# Patient Record
Sex: FEMALE | Race: WHITE | NOT HISPANIC OR LATINO | Employment: UNEMPLOYED | URBAN - METROPOLITAN AREA
[De-identification: names, ages, dates, MRNs, and addresses within clinical notes are randomized per-mention and may not be internally consistent; named-entity substitution may affect disease eponyms.]

---

## 2020-08-12 ENCOUNTER — HISTORICAL (OUTPATIENT)
Dept: INFUSION THERAPY | Facility: HOSPITAL | Age: 75
End: 2020-08-12

## 2020-11-12 ENCOUNTER — HISTORICAL (OUTPATIENT)
Dept: INFUSION THERAPY | Facility: HOSPITAL | Age: 75
End: 2020-11-12

## 2021-02-04 ENCOUNTER — HISTORICAL (OUTPATIENT)
Dept: INFUSION THERAPY | Facility: HOSPITAL | Age: 76
End: 2021-02-04

## 2021-04-29 ENCOUNTER — HISTORICAL (OUTPATIENT)
Dept: INFUSION THERAPY | Facility: HOSPITAL | Age: 76
End: 2021-04-29

## 2021-07-22 ENCOUNTER — HISTORICAL (OUTPATIENT)
Dept: INFUSION THERAPY | Facility: HOSPITAL | Age: 76
End: 2021-07-22

## 2022-04-29 DIAGNOSIS — C85.89 MARGINAL ZONE LYMPHOMA OF EXTRANODAL AND SOLID ORGAN SITES: Primary | ICD-10-CM

## 2022-04-29 DIAGNOSIS — D61.818 PANCYTOPENIA: ICD-10-CM

## 2022-06-08 RX ORDER — ENTECAVIR 1 MG/1
1 TABLET, FILM COATED ORAL DAILY
Qty: 30 TABLET | Refills: 2 | Status: CANCELLED | OUTPATIENT
Start: 2022-06-08

## 2022-06-08 RX ORDER — ENTECAVIR 1 MG/1
1 TABLET, FILM COATED ORAL DAILY
COMMUNITY
Start: 2021-11-03 | End: 2022-06-09 | Stop reason: SDUPTHER

## 2022-06-09 DIAGNOSIS — C85.89 MARGINAL ZONE LYMPHOMA OF EXTRANODAL AND SOLID ORGAN SITES: Primary | ICD-10-CM

## 2022-06-09 RX ORDER — ENTECAVIR 1 MG/1
1 TABLET, FILM COATED ORAL DAILY
Qty: 30 TABLET | Refills: 3 | Status: SHIPPED | OUTPATIENT
Start: 2022-06-09 | End: 2022-07-28 | Stop reason: SDUPTHER

## 2022-07-27 DIAGNOSIS — C85.89 MARGINAL ZONE LYMPHOMA OF EXTRANODAL AND SOLID ORGAN SITES: Primary | ICD-10-CM

## 2022-07-28 ENCOUNTER — OFFICE VISIT (OUTPATIENT)
Dept: HEMATOLOGY/ONCOLOGY | Facility: CLINIC | Age: 77
End: 2022-07-28
Payer: COMMERCIAL

## 2022-07-28 VITALS
HEART RATE: 80 BPM | SYSTOLIC BLOOD PRESSURE: 102 MMHG | DIASTOLIC BLOOD PRESSURE: 64 MMHG | RESPIRATION RATE: 16 BRPM | OXYGEN SATURATION: 94 % | BODY MASS INDEX: 17.51 KG/M2 | TEMPERATURE: 98 F | HEIGHT: 66 IN | WEIGHT: 108.94 LBS

## 2022-07-28 DIAGNOSIS — C85.89 MARGINAL ZONE LYMPHOMA OF EXTRANODAL AND SOLID ORGAN SITES: Primary | ICD-10-CM

## 2022-07-28 PROCEDURE — 99214 PR OFFICE/OUTPT VISIT, EST, LEVL IV, 30-39 MIN: ICD-10-PCS | Mod: S$GLB,,, | Performed by: INTERNAL MEDICINE

## 2022-07-28 PROCEDURE — 99999 PR PBB SHADOW E&M-EST. PATIENT-LVL IV: ICD-10-PCS | Mod: PBBFAC,,, | Performed by: INTERNAL MEDICINE

## 2022-07-28 PROCEDURE — 99214 OFFICE O/P EST MOD 30 MIN: CPT | Mod: S$GLB,,, | Performed by: INTERNAL MEDICINE

## 2022-07-28 PROCEDURE — 99999 PR PBB SHADOW E&M-EST. PATIENT-LVL IV: CPT | Mod: PBBFAC,,, | Performed by: INTERNAL MEDICINE

## 2022-07-28 RX ORDER — SACUBITRIL AND VALSARTAN 49; 51 MG/1; MG/1
1 TABLET, FILM COATED ORAL 2 TIMES DAILY
COMMUNITY
Start: 2022-07-21

## 2022-07-28 RX ORDER — METOPROLOL SUCCINATE 25 MG/1
1 TABLET, EXTENDED RELEASE ORAL DAILY
COMMUNITY
Start: 2022-07-21

## 2022-07-28 RX ORDER — ENTECAVIR 1 MG/1
1 TABLET, FILM COATED ORAL DAILY
Qty: 30 TABLET | Refills: 3 | Status: SHIPPED | OUTPATIENT
Start: 2022-07-28 | End: 2023-02-01 | Stop reason: SDUPTHER
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
## 2023-02-01 DIAGNOSIS — C85.89 MARGINAL ZONE LYMPHOMA OF EXTRANODAL AND SOLID ORGAN SITES: ICD-10-CM

## 2023-02-01 RX ORDER — ENTECAVIR 1 MG/1
1 TABLET, FILM COATED ORAL DAILY
Qty: 30 TABLET | Refills: 3 | Status: SHIPPED | OUTPATIENT
Start: 2023-02-01

## 2023-05-26 ENCOUNTER — OFFICE VISIT (OUTPATIENT)
Dept: HEMATOLOGY/ONCOLOGY | Facility: CLINIC | Age: 78
End: 2023-05-26
Payer: COMMERCIAL

## 2023-05-26 VITALS
HEART RATE: 78 BPM | RESPIRATION RATE: 20 BRPM | HEIGHT: 66 IN | OXYGEN SATURATION: 95 % | DIASTOLIC BLOOD PRESSURE: 63 MMHG | BODY MASS INDEX: 17.78 KG/M2 | SYSTOLIC BLOOD PRESSURE: 99 MMHG | WEIGHT: 110.63 LBS | TEMPERATURE: 98 F

## 2023-05-26 DIAGNOSIS — C85.89 MARGINAL ZONE LYMPHOMA OF EXTRANODAL AND SOLID ORGAN SITES: Primary | ICD-10-CM

## 2023-05-26 PROCEDURE — 99214 PR OFFICE/OUTPT VISIT, EST, LEVL IV, 30-39 MIN: ICD-10-PCS | Mod: S$GLB,,, | Performed by: INTERNAL MEDICINE

## 2023-05-26 PROCEDURE — 99214 OFFICE O/P EST MOD 30 MIN: CPT | Mod: S$GLB,,, | Performed by: INTERNAL MEDICINE

## 2023-05-26 PROCEDURE — 99999 PR PBB SHADOW E&M-EST. PATIENT-LVL IV: ICD-10-PCS | Mod: PBBFAC,,, | Performed by: INTERNAL MEDICINE

## 2023-05-26 PROCEDURE — 99999 PR PBB SHADOW E&M-EST. PATIENT-LVL IV: CPT | Mod: PBBFAC,,, | Performed by: INTERNAL MEDICINE
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
## 2023-11-16 ENCOUNTER — OFFICE VISIT (OUTPATIENT)
Dept: HEMATOLOGY/ONCOLOGY | Facility: CLINIC | Age: 78
End: 2023-11-16
Payer: COMMERCIAL

## 2023-11-16 VITALS
OXYGEN SATURATION: 96 % | HEIGHT: 66 IN | TEMPERATURE: 98 F | WEIGHT: 118.81 LBS | DIASTOLIC BLOOD PRESSURE: 72 MMHG | RESPIRATION RATE: 18 BRPM | SYSTOLIC BLOOD PRESSURE: 108 MMHG | BODY MASS INDEX: 19.09 KG/M2 | HEART RATE: 59 BPM

## 2023-11-16 DIAGNOSIS — C85.89 MARGINAL ZONE LYMPHOMA OF EXTRANODAL AND SOLID ORGAN SITES: Primary | ICD-10-CM

## 2023-11-16 PROCEDURE — 99999 PR PBB SHADOW E&M-EST. PATIENT-LVL IV: CPT | Mod: PBBFAC,,, | Performed by: INTERNAL MEDICINE

## 2023-11-16 PROCEDURE — 99214 OFFICE O/P EST MOD 30 MIN: CPT | Mod: S$GLB,,, | Performed by: INTERNAL MEDICINE

## 2023-11-16 PROCEDURE — 99999 PR PBB SHADOW E&M-EST. PATIENT-LVL IV: ICD-10-PCS | Mod: PBBFAC,,, | Performed by: INTERNAL MEDICINE

## 2023-11-16 PROCEDURE — 99214 PR OFFICE/OUTPT VISIT, EST, LEVL IV, 30-39 MIN: ICD-10-PCS | Mod: S$GLB,,, | Performed by: INTERNAL MEDICINE
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
## 2023-12-22 ENCOUNTER — OFFICE VISIT (OUTPATIENT)
Dept: HEMATOLOGY/ONCOLOGY | Facility: CLINIC | Age: 78
End: 2023-12-22
Payer: COMMERCIAL

## 2023-12-22 DIAGNOSIS — D61.818 PANCYTOPENIA: ICD-10-CM

## 2023-12-22 DIAGNOSIS — C85.89 MARGINAL ZONE LYMPHOMA OF EXTRANODAL AND SOLID ORGAN SITES: Primary | ICD-10-CM

## 2023-12-22 PROCEDURE — 99443 PR PHYSICIAN TELEPHONE EVALUATION 21-30 MIN: ICD-10-PCS | Mod: NDTC,,, | Performed by: INTERNAL MEDICINE

## 2023-12-22 PROCEDURE — 99443 PR PHYSICIAN TELEPHONE EVALUATION 21-30 MIN: CPT | Mod: NDTC,,, | Performed by: INTERNAL MEDICINE
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
## 2024-06-27 ENCOUNTER — HOSPITAL ENCOUNTER (EMERGENCY)
Facility: HOSPITAL | Age: 79
Discharge: ANOTHER HOSPITAL | End: 2024-06-27
Attending: STUDENT IN AN ORGANIZED HEALTH CARE EDUCATION/TRAINING PROGRAM
Payer: MEDICARE

## 2024-06-27 ENCOUNTER — CLINICAL SUPPORT (OUTPATIENT)
Dept: RESPIRATORY THERAPY | Facility: HOSPITAL | Age: 79
End: 2024-06-27
Attending: INTERNAL MEDICINE
Payer: MEDICARE

## 2024-06-27 ENCOUNTER — OFFICE VISIT (OUTPATIENT)
Dept: HEMATOLOGY/ONCOLOGY | Facility: CLINIC | Age: 79
End: 2024-06-27
Payer: MEDICARE

## 2024-06-27 VITALS
HEART RATE: 48 BPM | WEIGHT: 128 LBS | SYSTOLIC BLOOD PRESSURE: 90 MMHG | OXYGEN SATURATION: 96 % | DIASTOLIC BLOOD PRESSURE: 56 MMHG | RESPIRATION RATE: 20 BRPM | TEMPERATURE: 98 F | HEIGHT: 65 IN | BODY MASS INDEX: 21.33 KG/M2

## 2024-06-27 VITALS
HEART RATE: 73 BPM | SYSTOLIC BLOOD PRESSURE: 84 MMHG | DIASTOLIC BLOOD PRESSURE: 59 MMHG | OXYGEN SATURATION: 96 % | WEIGHT: 128.69 LBS | HEIGHT: 66 IN | TEMPERATURE: 98 F | RESPIRATION RATE: 18 BRPM | BODY MASS INDEX: 20.68 KG/M2

## 2024-06-27 DIAGNOSIS — I48.91 ATRIAL FIBRILLATION WITH RAPID VENTRICULAR RESPONSE: ICD-10-CM

## 2024-06-27 DIAGNOSIS — I49.9 IRREGULAR HEART BEAT: Primary | ICD-10-CM

## 2024-06-27 DIAGNOSIS — E86.1 HYPOVOLEMIA: ICD-10-CM

## 2024-06-27 DIAGNOSIS — I48.91 ATRIAL FIBRILLATION WITH RVR: Primary | ICD-10-CM

## 2024-06-27 DIAGNOSIS — R00.0 TACHYCARDIA: ICD-10-CM

## 2024-06-27 DIAGNOSIS — I95.9 HYPOTENSION, UNSPECIFIED HYPOTENSION TYPE: ICD-10-CM

## 2024-06-27 DIAGNOSIS — I49.9 IRREGULAR HEART BEAT: ICD-10-CM

## 2024-06-27 DIAGNOSIS — R00.2 PALPITATIONS: ICD-10-CM

## 2024-06-27 DIAGNOSIS — C85.89 MARGINAL ZONE LYMPHOMA OF EXTRANODAL AND SOLID ORGAN SITES: Primary | ICD-10-CM

## 2024-06-27 LAB
ALBUMIN SERPL-MCNC: 4.2 G/DL (ref 3.4–4.8)
ALBUMIN/GLOB SERPL: 1.5 RATIO (ref 1.1–2)
ALP SERPL-CCNC: 111 UNIT/L (ref 40–150)
ALT SERPL-CCNC: 17 UNIT/L (ref 0–55)
ANION GAP SERPL CALC-SCNC: 12 MEQ/L
APTT PPP: 30.2 SECONDS (ref 24.6–37.2)
AST SERPL-CCNC: 27 UNIT/L (ref 5–34)
BASOPHILS # BLD AUTO: 0.01 X10(3)/MCL
BASOPHILS NFR BLD AUTO: 0.2 %
BILIRUB SERPL-MCNC: 0.9 MG/DL
BNP BLD-MCNC: 149.7 PG/ML
BUN SERPL-MCNC: 20 MG/DL (ref 9.8–20.1)
CALCIUM SERPL-MCNC: 9.5 MG/DL (ref 8.4–10.2)
CHLORIDE SERPL-SCNC: 106 MMOL/L (ref 98–107)
CO2 SERPL-SCNC: 23 MMOL/L (ref 23–31)
CREAT SERPL-MCNC: 0.78 MG/DL (ref 0.55–1.02)
CREAT/UREA NIT SERPL: 26
D DIMER PPP IA.FEU-MCNC: 0.42 UG/ML FEU (ref 0–0.5)
EOSINOPHIL # BLD AUTO: 0.11 X10(3)/MCL (ref 0–0.9)
EOSINOPHIL NFR BLD AUTO: 2.4 %
ERYTHROCYTE [DISTWIDTH] IN BLOOD BY AUTOMATED COUNT: 13.7 % (ref 11.5–17)
GFR SERPLBLD CREATININE-BSD FMLA CKD-EPI: >60 ML/MIN/1.73/M2
GLOBULIN SER-MCNC: 2.8 GM/DL (ref 2.4–3.5)
GLUCOSE SERPL-MCNC: 101 MG/DL (ref 82–115)
HCT VFR BLD AUTO: 44 % (ref 37–47)
HGB BLD-MCNC: 14.9 G/DL (ref 12–16)
IMM GRANULOCYTES # BLD AUTO: 0.01 X10(3)/MCL (ref 0–0.04)
IMM GRANULOCYTES NFR BLD AUTO: 0.2 %
LACTATE SERPL-SCNC: 2.2 MMOL/L (ref 0.5–2.2)
LYMPHOCYTES # BLD AUTO: 1.88 X10(3)/MCL (ref 0.6–4.6)
LYMPHOCYTES NFR BLD AUTO: 40.6 %
MAGNESIUM SERPL-MCNC: 2 MG/DL (ref 1.6–2.6)
MCH RBC QN AUTO: 32.1 PG (ref 27–31)
MCHC RBC AUTO-ENTMCNC: 33.9 G/DL (ref 33–36)
MCV RBC AUTO: 94.8 FL (ref 80–94)
MONOCYTES # BLD AUTO: 0.36 X10(3)/MCL (ref 0.1–1.3)
MONOCYTES NFR BLD AUTO: 7.8 %
NEUTROPHILS # BLD AUTO: 2.26 X10(3)/MCL (ref 2.1–9.2)
NEUTROPHILS NFR BLD AUTO: 48.8 %
PLATELET # BLD AUTO: 185 X10(3)/MCL (ref 130–400)
PMV BLD AUTO: 9.7 FL (ref 7.4–10.4)
POTASSIUM SERPL-SCNC: 3.8 MMOL/L (ref 3.5–5.1)
PROT SERPL-MCNC: 7 GM/DL (ref 5.8–7.6)
RBC # BLD AUTO: 4.64 X10(6)/MCL (ref 4.2–5.4)
SODIUM SERPL-SCNC: 141 MMOL/L (ref 136–145)
TROPONIN I SERPL-MCNC: <0.01 NG/ML (ref 0–0.04)
TSH SERPL-ACNC: 3.98 UIU/ML (ref 0.35–4.94)
WBC # BLD AUTO: 4.63 X10(3)/MCL (ref 4.5–11.5)

## 2024-06-27 PROCEDURE — 85025 COMPLETE CBC W/AUTO DIFF WBC: CPT | Performed by: STUDENT IN AN ORGANIZED HEALTH CARE EDUCATION/TRAINING PROGRAM

## 2024-06-27 PROCEDURE — 87040 BLOOD CULTURE FOR BACTERIA: CPT | Performed by: STUDENT IN AN ORGANIZED HEALTH CARE EDUCATION/TRAINING PROGRAM

## 2024-06-27 PROCEDURE — 3074F SYST BP LT 130 MM HG: CPT | Mod: CPTII,S$GLB,, | Performed by: INTERNAL MEDICINE

## 2024-06-27 PROCEDURE — 99291 CRITICAL CARE FIRST HOUR: CPT

## 2024-06-27 PROCEDURE — 83735 ASSAY OF MAGNESIUM: CPT | Performed by: STUDENT IN AN ORGANIZED HEALTH CARE EDUCATION/TRAINING PROGRAM

## 2024-06-27 PROCEDURE — 63600175 PHARM REV CODE 636 W HCPCS: Performed by: STUDENT IN AN ORGANIZED HEALTH CARE EDUCATION/TRAINING PROGRAM

## 2024-06-27 PROCEDURE — 25000003 PHARM REV CODE 250: Performed by: STUDENT IN AN ORGANIZED HEALTH CARE EDUCATION/TRAINING PROGRAM

## 2024-06-27 PROCEDURE — 93005 ELECTROCARDIOGRAM TRACING: CPT

## 2024-06-27 PROCEDURE — 1101F PT FALLS ASSESS-DOCD LE1/YR: CPT | Mod: CPTII,S$GLB,, | Performed by: INTERNAL MEDICINE

## 2024-06-27 PROCEDURE — 80053 COMPREHEN METABOLIC PANEL: CPT | Performed by: STUDENT IN AN ORGANIZED HEALTH CARE EDUCATION/TRAINING PROGRAM

## 2024-06-27 PROCEDURE — 99999 PR PBB SHADOW E&M-EST. PATIENT-LVL IV: CPT | Mod: PBBFAC,,, | Performed by: INTERNAL MEDICINE

## 2024-06-27 PROCEDURE — 83605 ASSAY OF LACTIC ACID: CPT | Performed by: STUDENT IN AN ORGANIZED HEALTH CARE EDUCATION/TRAINING PROGRAM

## 2024-06-27 PROCEDURE — 96374 THER/PROPH/DIAG INJ IV PUSH: CPT

## 2024-06-27 PROCEDURE — 93010 ELECTROCARDIOGRAM REPORT: CPT | Mod: 76,,, | Performed by: INTERNAL MEDICINE

## 2024-06-27 PROCEDURE — 3288F FALL RISK ASSESSMENT DOCD: CPT | Mod: CPTII,S$GLB,, | Performed by: INTERNAL MEDICINE

## 2024-06-27 PROCEDURE — 3078F DIAST BP <80 MM HG: CPT | Mod: CPTII,S$GLB,, | Performed by: INTERNAL MEDICINE

## 2024-06-27 PROCEDURE — 96361 HYDRATE IV INFUSION ADD-ON: CPT

## 2024-06-27 PROCEDURE — 85379 FIBRIN DEGRADATION QUANT: CPT | Performed by: STUDENT IN AN ORGANIZED HEALTH CARE EDUCATION/TRAINING PROGRAM

## 2024-06-27 PROCEDURE — 1126F AMNT PAIN NOTED NONE PRSNT: CPT | Mod: CPTII,S$GLB,, | Performed by: INTERNAL MEDICINE

## 2024-06-27 PROCEDURE — 84443 ASSAY THYROID STIM HORMONE: CPT | Performed by: STUDENT IN AN ORGANIZED HEALTH CARE EDUCATION/TRAINING PROGRAM

## 2024-06-27 PROCEDURE — 1159F MED LIST DOCD IN RCRD: CPT | Mod: CPTII,S$GLB,, | Performed by: INTERNAL MEDICINE

## 2024-06-27 PROCEDURE — 84484 ASSAY OF TROPONIN QUANT: CPT | Performed by: STUDENT IN AN ORGANIZED HEALTH CARE EDUCATION/TRAINING PROGRAM

## 2024-06-27 PROCEDURE — 99214 OFFICE O/P EST MOD 30 MIN: CPT | Mod: S$GLB,,, | Performed by: INTERNAL MEDICINE

## 2024-06-27 PROCEDURE — 96375 TX/PRO/DX INJ NEW DRUG ADDON: CPT

## 2024-06-27 PROCEDURE — 85730 THROMBOPLASTIN TIME PARTIAL: CPT | Performed by: STUDENT IN AN ORGANIZED HEALTH CARE EDUCATION/TRAINING PROGRAM

## 2024-06-27 PROCEDURE — 83880 ASSAY OF NATRIURETIC PEPTIDE: CPT | Performed by: STUDENT IN AN ORGANIZED HEALTH CARE EDUCATION/TRAINING PROGRAM

## 2024-06-27 RX ORDER — METOPROLOL TARTRATE 1 MG/ML
5 INJECTION, SOLUTION INTRAVENOUS
Status: COMPLETED | OUTPATIENT
Start: 2024-06-27 | End: 2024-06-27

## 2024-06-27 RX ORDER — SODIUM CHLORIDE, SODIUM LACTATE, POTASSIUM CHLORIDE, CALCIUM CHLORIDE 600; 310; 30; 20 MG/100ML; MG/100ML; MG/100ML; MG/100ML
1000 INJECTION, SOLUTION INTRAVENOUS
Status: COMPLETED | OUTPATIENT
Start: 2024-06-27 | End: 2024-06-27

## 2024-06-27 RX ORDER — AMIODARONE HYDROCHLORIDE 150 MG/3ML
150 INJECTION, SOLUTION INTRAVENOUS
Status: COMPLETED | OUTPATIENT
Start: 2024-06-27 | End: 2024-06-27

## 2024-06-27 RX ADMIN — AMIODARONE HYDROCHLORIDE 1 MG/MIN: 1.8 INJECTION, SOLUTION INTRAVENOUS at 03:06

## 2024-06-27 RX ADMIN — METOPROLOL TARTRATE 5 MG: 1 INJECTION, SOLUTION INTRAVENOUS at 03:06

## 2024-06-27 RX ADMIN — SODIUM CHLORIDE, POTASSIUM CHLORIDE, SODIUM LACTATE AND CALCIUM CHLORIDE 1000 ML: 600; 310; 30; 20 INJECTION, SOLUTION INTRAVENOUS at 03:06

## 2024-06-27 RX ADMIN — SODIUM CHLORIDE, POTASSIUM CHLORIDE, SODIUM LACTATE AND CALCIUM CHLORIDE 1000 ML: 600; 310; 30; 20 INJECTION, SOLUTION INTRAVENOUS at 04:06

## 2024-06-27 RX ADMIN — AMIODARONE HYDROCHLORIDE 150 MG: 50 INJECTION, SOLUTION INTRAVENOUS at 03:06
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
## 2024-06-27 NOTE — ED PROVIDER NOTES
Encounter Date: 2024       History     Chief Complaint   Patient presents with    Tachycardia     Sent from [facility] for new onset afib RVR. Pt states she feels fine. AAOx4. Ambulating independently. States has natural low BP.     HPI    79-year-old female with a past medical history of non Hodgkin's lymphoma in remission, CHF on Entresto metoprolol unknown EF presents emergency department from the [facility] for new onset AFib RVR.  Patient states she feels fine with no complaints.  Denies any chest pain.  No palpitations.  No shortness a breath.  States she had a mildly stressful morning as they caught a flat tire coming into town as they will out of town and travel here for her oncology visits.  States that she does not know any history of AFib.  Denies being on any type of anticoagulation.    Review of patient's allergies indicates:  No Known Allergies  Past Medical History:   Diagnosis Date    Other specified types of non-hodgkin lymphoma, unspecified site      Past Surgical History:   Procedure Laterality Date     SECTION       Family History   Problem Relation Name Age of Onset    Cancer Brother       Social History     Tobacco Use    Smoking status: Never    Smokeless tobacco: Never   Substance Use Topics    Alcohol use: Never    Drug use: Never     Review of Systems   Constitutional:  Negative for fever.   Respiratory:  Negative for cough and shortness of breath.    Cardiovascular:  Negative for chest pain.   Gastrointestinal:  Negative for abdominal pain, constipation, diarrhea, nausea and vomiting.   Musculoskeletal:  Negative for back pain.   Skin:  Negative for rash.   Neurological:  Negative for weakness and headaches.   Hematological:  Does not bruise/bleed easily.   All other systems reviewed and are negative.      Physical Exam     Initial Vitals [24 1506]   BP Pulse Resp Temp SpO2   (!) 68/37 (!) 162 20 97.7 °F (36.5 °C) 96 %      MAP       --         Physical  Exam    Nursing note and vitals reviewed.  Constitutional: She appears well-developed and well-nourished. No distress.   Cardiovascular:  An irregularly irregular rhythm present.   Tachycardia present.         Pulmonary/Chest: Breath sounds normal. No respiratory distress. She has no wheezes. She has no rhonchi. She has no rales.   Abdominal: Abdomen is soft. There is no abdominal tenderness. There is no rebound and no guarding.   Musculoskeletal:         General: No tenderness or edema. Normal range of motion.     Neurological: She is alert and oriented to person, place, and time. She has normal strength.   Skin: Skin is warm. Capillary refill takes less than 2 seconds.         ED Course   Critical Care    Date/Time: 6/27/2024 5:04 PM    Performed by: Alexandro Amaral MD  Authorized by: Alexandro Amaral MD  Direct patient critical care time: 55 minutes  Additional history critical care time: 2 minutes  Ordering / reviewing critical care time: 5 minutes  Documentation critical care time: 2 minutes  Consulting other physicians critical care time: 10 minutes  Total critical care time (exclusive of procedural time) : 74 minutes  Critical care time was exclusive of separately billable procedures and treating other patients.  Critical care was necessary to treat or prevent imminent or life-threatening deterioration of the following conditions: circulatory failure and dehydration.  Critical care was time spent personally by me on the following activities: development of treatment plan with patient or surrogate, discussions with consultants, interpretation of cardiac output measurements, evaluation of patient's response to treatment, examination of patient, obtaining history from patient or surrogate, ordering and performing treatments and interventions, ordering and review of laboratory studies, ordering and review of radiographic studies, pulse oximetry, re-evaluation of patient's condition and review of old  charts.        Labs Reviewed   B-TYPE NATRIURETIC PEPTIDE - Abnormal; Notable for the following components:       Result Value    Natriuretic Peptide 149.7 (*)     All other components within normal limits   CBC WITH DIFFERENTIAL - Abnormal; Notable for the following components:    MCV 94.8 (*)     MCH 32.1 (*)     All other components within normal limits   APTT - Normal   MAGNESIUM - Normal   TROPONIN I - Normal   TSH - Normal   D DIMER, QUANTITATIVE - Normal   LACTIC ACID, PLASMA - Normal   BLOOD CULTURE OLG   BLOOD CULTURE OLG   CBC W/ AUTO DIFFERENTIAL    Narrative:     The following orders were created for panel order CBC auto differential.  Procedure                               Abnormality         Status                     ---------                               -----------         ------                     CBC with Differential[[id number]]       Abnormal            Final result                 Please view results for these tests on the individual orders.   COMPREHENSIVE METABOLIC PANEL   URINALYSIS, REFLEX TO URINE CULTURE   LACTIC ACID, PLASMA     EKG Readings: (Independently Interpreted)   Initial Reading: No STEMI. Rhythm: Atrial Fibrillation. Heart Rate: 158. Ectopy: No Ectopy. Conduction: Normal. ST Segments: Non-Specific ST Segment Depression. T Waves: Normal. Clinical Impression: Atrial Fibrillation with RVR     ECG Results              EKG 12-lead (In process)        Collection Time Result Time QRS Duration OHS QTC Calculation    06/27/24 15:07:31 06/27/24 17:01:36 86 408                     In process by Interface, Lab In [facility] (06/27/24 17:01:43)                   Narrative:    Test Reason : R00.0,    Vent. Rate : 080 BPM     Atrial Rate : 080 BPM     P-R Int : 136 ms          QRS Dur : 086 ms      QT Int : 354 ms       P-R-T Axes : 088 085 071 degrees     QTc Int : 408 ms    Normal sinus rhythm  Biatrial enlargement  Abnormal ECG  When compared with ECG of 27-JUN-2024 15:04,  Sinus rhythm  has replaced Atrial fibrillation  Vent. rate has decreased BY  78 BPM  T wave inversion no longer evident in Inferior leads    Referred By: [email or web address]   SELF           Confirmed By:                                      EKG 12-lead (In process)        Collection Time Result Time QRS Duration OHS QTC Calculation    06/27/24 15:04:29 06/27/24 16:57:11 80 492                     In process by Interface, Lab In [facility] (06/27/24 16:57:18)                   Narrative:    Test Reason : R00.2,    Vent. Rate : 158 BPM     Atrial Rate : 357 BPM     P-R Int : 000 ms          QRS Dur : 080 ms      QT Int : 304 ms       P-R-T Axes : 000 083 -70 degrees     QTc Int : 492 ms    Atrial fibrillation with rapid ventricular response  ST and T wave abnormality, consider inferior ischemia  Abnormal ECG  When compared with ECG of 27-JUN-2024 14:30,  T wave inversion no longer evident in Lateral leads    Referred By: AAAREFERR   SELF           Confirmed By:                       In process by Interface, Lab In [facility] (06/27/24 16:55:02)                   Narrative:    Test Reason : R00.2,    Vent. Rate : 158 BPM     Atrial Rate : 357 BPM     P-R Int : 000 ms          QRS Dur : 080 ms      QT Int : 304 ms       P-R-T Axes : 000 083 -70 degrees     QTc Int : 492 ms    Atrial fibrillation with rapid ventricular response  ST and T wave abnormality, consider inferior ischemia  Abnormal ECG  When compared with ECG of 27-JUN-2024 14:30,  T wave inversion no longer evident in Lateral leads    Referred By: AAAREFERR   SELF           Confirmed By:                                   Imaging Results              X-Ray Chest 1 View (Final result)  Result time 06/27/24 16:22:44      Final result by Evans Gaming MD (06/27/24 16:22:44)                   Impression:      1. No active cardiopulmonary disease identified      Electronically signed by: Evans aGming  Date:    06/27/2024  Time:    16:22               Narrative:    EXAMINATION:  XR CHEST 1  VIEW    CLINICAL HISTORY:  , Palpitations.    COMPARISON:  None available    FINDINGS:  An AP view or more reveals the heart to be normal in size.  The trachea is midline.  No definite infiltrate or effusion is seen.  The lungs are well expanded.  Degenerative changes are noted to the thoracic spine.  Bony structures are osteopenic.                        Wet Read by Alexandro Amaral MD (06/27/24 15:50:37, [facility] - Emergency Dept, Emergency Medicine)    Lungs clear                                     Medications   amiodarone 360 mg/200 mL (1.8 mg/mL) infusion (1 mg/min Intravenous New Bag 6/27/24 1551)   lactated ringers bolus 1,000 mL (0 mLs Intravenous Stopped 6/27/24 1610)   metoprolol injection 5 mg (5 mg Intravenous Given 6/27/24 1515)   amiodarone injection 150 mg (150 mg Intravenous Given 6/27/24 1548)   lactated ringers bolus 1,000 mL (0 mLs Intravenous Stopped 6/27/24 1647)   lactated ringers infusion (1,000 mLs Intravenous New Bag 6/27/24 1630)     Medical Decision Making  Initial Assessment:   Afib    Differential Diagnosis:   Judging by the patient's chief complaint and pertinent history, the patient has the following possible differential diagnoses, including but not limited to the following.  Some of these are deemed to be lower likelihood and some more likely based on my physical exam and history combined with possible lab work and/or imaging studies.   Please see the pertinent studies, and refer to the HPI.  Some of these diagnoses will take further evaluation to fully rule out, perhaps as an outpatient and the patient was encouraged to follow up when discharged for more comprehensive evaluation.  AFib, sepsis, PE, electrolyte abnormality, dehydration,  as well as multiple other possible etiologies      Problems Addressed:  Atrial fibrillation with RVR: acute illness or injury  Hypovolemia: acute illness or injury    Amount and/or Complexity of Data Reviewed  Independent Historian:  caregiver  Labs: ordered. Decision-making details documented in ED Course.  Radiology: ordered and independent interpretation performed.  ECG/medicine tests: ordered and independent interpretation performed.    Risk  Prescription drug management.  Decision regarding hospitalization.               ED Course as of 06/27/24 1705   Thu Jun 27, 2024   1511 Patient going in and out of AFib RVR.  Blood pressure is soft although she states her blood pressure is always in her lower in.  She is asymptomatic.  Will give her some metoprolol 5. [BS]   1533 Patient remains in AFib RVR.  Her blood pressure soft but at her baseline.  Will start amnio. [BS]   1549 When patient's heart rate dropped to the low 1 teens she was still hypotensive.  I am concerned this is more of a volume depletion rather than a unstable AFib RVR.  Will give an additional L of fluids. [BS]   1549 Amiodarone given.  Heart rate improved.  Blood pressure slowly improving.  Cardiology paged [BS]   1612 Patient presents emergency department I was originally gone to emergently cardiovert her because she was in AFib RVR with unstable vital signs with her blood pressure in the 70s.  Has been ruled according to the room she converted to a normal sinus rhythm in the 70 80s range and her blood pressure was still in the 80s/70s systolic range.  At this time she returned back into AFib RVR in the 140s and her blood pressure remained.  Had high suspicion that the patient was in fact in AFib secondary to hypovolemia rather than having a hypotensive episode because of unstable AFib.  I decided to give patient IVF rather than cardioversion because of the reason alone.  Patient was completely asymptomatic. [BS]   1616 TSH: 3.983 [BS]   1616 Magnesium : 2.00 [BS]   1616 BNP(!): 149.7 [BS]   1616 WBC: 4.63 [BS]   1616 Hemoglobin: 14.9 [BS]   1616 Hematocrit: 44.0 [BS]   1616 Platelet Count: 185 [BS]   1616 Sodium: 141 [BS]   1616 Potassium: 3.8 [BS]   1616 Chloride: 106 [BS]    1616 CO2: 23 [BS]   1616 Glucose: 101 [BS]   1616 BUN: 20.0 [BS]   1616 Creatinine: 0.78 [BS]   1616 Troponin I: <0.010 [BS]   1621 Cardiology agrees with plan thus far continue fluid resuscitation [BS]   1626 [facility] [BS]   1649 D-Dimer: 0.42 [BS]   1652 Patient accepted to [facility] for cardiology eval [BS]      ED Course User Index  [BS] Alexandro Amaral MD                           Clinical Impression:  Final diagnoses:  [R00.0] Tachycardia  [R00.2] Palpitations  [I48.91] Atrial fibrillation with RVR (Primary)  [E86.1] Hypovolemia          ED Disposition Condition    Transfer to Another Facility Stable                Alexandro Amaral MD  06/27/24 0609

## 2024-06-28 NOTE — TELEMEDICINE CONSULT
[facility] - Emergency Dept    Cardiology  Consult Note    Patient Name: Yarelis Frey  MRN: [id number]  Admission Date: 2024  Hospital Length of Stay: 0 days  Code Status: No Order   Attending Provider: No att. providers found   Consulting Provider: Daniel Stroud MD  Primary Care Physician: Patrick Birmingham III, MD  Principal Problem:<principal problem not specified>    Patient information was obtained from patient and ER records.     Subjective:     Chief Complaint/Reason for Consult: Afib with RVR    HPI:  Patient is a 79-year-old female with history of non-Hodgkin's lymphoma went to see her oncologist the afternoon.  Patient blood pressure was found to be low as well she was tachycardic.  Patient was sent to emergency room.  Patient reported that has no symptoms.  Denied any palpitations.  No chest pain reported.  She does not have any known history of atrial fibrillation.  She has a history of heart failure and has been on Entresto and metoprolol.  No recent echocardiogram time.    Patient was interviewed.  Patient reported she has been feeling fine.  She answered all questions appropriately.  No chest pain, shortness of breath, PND, heparin, syncopal or presyncopal symptoms reported.  Her blood pressure was in 80s systolic the patient was in sinus rhythm with heart rate of 60.  No bleeding from any orifice reported.  She has never been on any anticoagulation.          Past Medical History:   Diagnosis Date    Other specified types of non-hodgkin lymphoma, unspecified site      Past Surgical History:   Procedure Laterality Date     SECTION       Review of patient's allergies indicates:  No Known Allergies  No current facility-administered medications on file prior to encounter.     Current Outpatient Medications on File Prior to Encounter   Medication Sig    entecavir (BARACLUDE) 1 MG Tab Take 1 tablet (1 mg total) by mouth Daily. (Patient not taking: Reported on 2023)    ENTRESTO  49-51 mg per tablet Take 1 tablet by mouth 2 (two) times a day.    metoprolol succinate (TOPROL-XL) 25 MG 24 hr tablet Take 1 tablet by mouth once daily.     Family History       Problem Relation (Age of Onset)    Cancer Brother          Tobacco Use    Smoking status: Never    Smokeless tobacco: Never   Substance and Sexual Activity    Alcohol use: Never    Drug use: Never    Sexual activity: Not on file       Review of Systems:  Comprehensive review of systems performed and is negative except written above.  Objective:     Vital Signs (Most Recent):  Temp: 97.7 °F (36.5 °C) (06/27/24 1506)  Pulse: (!) 48 (06/27/24 1815)  Resp: 20 (06/27/24 1815)  BP: (!) 90/56 (06/27/24 1800)  SpO2: 96 % (06/27/24 1815) Vital Signs (24h Range):  Temp:  [97.7 °F (36.5 °C)-98 °F (36.7 °C)] 97.7 °F (36.5 °C)  Pulse:  [] 48  Resp:  [15-28] 20  SpO2:  [94 %-99 %] 96 %  BP: ()/(37-71) 90/56   Weight: 58.1 kg (128 lb)  Body mass index is 21.59 kg/m².  SpO2: 96 %     No intake or output data in the 24 hours ending 06/28/24 0022  Lines/Drains/Airways       None                 Significant Labs:  Recent Results (from the past 72 hour(s))   EKG 12-lead    Collection Time: 06/27/24  2:30 PM   Result Value Ref Range    QRS Duration 88 ms    OHS QTC Calculation 483 ms   EKG 12-lead    Collection Time: 06/27/24  3:04 PM   Result Value Ref Range    QRS Duration 80 ms    OHS QTC Calculation 492 ms   APTT    Collection Time: 06/27/24  3:05 PM   Result Value Ref Range    PTT 30.2 24.6 - 37.2 seconds   Brain natriuretic peptide    Collection Time: 06/27/24  3:05 PM   Result Value Ref Range    Natriuretic Peptide 149.7 (H) <=100.0 pg/mL   Comprehensive metabolic panel    Collection Time: 06/27/24  3:05 PM   Result Value Ref Range    Sodium 141 136 - 145 mmol/L    Potassium 3.8 3.5 - 5.1 mmol/L    Chloride 106 98 - 107 mmol/L    CO2 23 23 - 31 mmol/L    Glucose 101 82 - 115 mg/dL    Blood Urea Nitrogen 20.0 9.8 - 20.1 mg/dL    Creatinine  0.78 0.55 - 1.02 mg/dL    Calcium 9.5 8.4 - 10.2 mg/dL    Protein Total 7.0 5.8 - 7.6 gm/dL    Albumin 4.2 3.4 - 4.8 g/dL    Globulin 2.8 2.4 - 3.5 gm/dL    Albumin/Globulin Ratio 1.5 1.1 - 2.0 ratio    Bilirubin Total 0.9 <=1.5 mg/dL     40 - 150 unit/L    ALT 17 0 - 55 unit/L    AST 27 5 - 34 unit/L    eGFR >60 mL/min/1.73/m2    Anion Gap 12.0 mEq/L    BUN/Creatinine Ratio 26    Magnesium    Collection Time: 06/27/24  3:05 PM   Result Value Ref Range    Magnesium Level 2.00 1.60 - 2.60 mg/dL   Troponin I    Collection Time: 06/27/24  3:05 PM   Result Value Ref Range    Troponin-I <0.010 0.000 - 0.045 ng/mL   TSH    Collection Time: 06/27/24  3:05 PM   Result Value Ref Range    TSH 3.983 0.350 - 4.940 uIU/mL   CBC with Differential    Collection Time: 06/27/24  3:05 PM   Result Value Ref Range    WBC 4.63 4.50 - 11.50 x10(3)/mcL    RBC 4.64 4.20 - 5.40 x10(6)/mcL    Hgb 14.9 12.0 - 16.0 g/dL    Hct 44.0 37.0 - 47.0 %    MCV 94.8 (H) 80.0 - 94.0 fL    MCH 32.1 (H) 27.0 - 31.0 pg    MCHC 33.9 33.0 - 36.0 g/dL    RDW 13.7 11.5 - 17.0 %    Platelet 185 130 - 400 x10(3)/mcL    MPV 9.7 7.4 - 10.4 fL    Neut % 48.8 %    Lymph % 40.6 %    Mono % 7.8 %    Eos % 2.4 %    Basophil % 0.2 %    Lymph # 1.88 0.6 - 4.6 x10(3)/mcL    Neut # 2.26 2.1 - 9.2 x10(3)/mcL    Mono # 0.36 0.1 - 1.3 x10(3)/mcL    Eos # 0.11 0 - 0.9 x10(3)/mcL    Baso # 0.01 <=0.2 x10(3)/mcL    IG# 0.01 0 - 0.04 x10(3)/mcL    IG% 0.2 %   D dimer, quantitative    Collection Time: 06/27/24  3:05 PM   Result Value Ref Range    D-Dimer 0.42 0.00 - 0.50 ug/mL FEU   EKG 12-lead    Collection Time: 06/27/24  3:07 PM   Result Value Ref Range    QRS Duration 86 ms    OHS QTC Calculation 408 ms   Lactic acid, plasma    Collection Time: 06/27/24  4:40 PM   Result Value Ref Range    Lactic Acid Level 2.2 0.5 - 2.2 mmol/L     Significant Imaging:  Imaging Results              X-Ray Chest 1 View (Final result)  Result time 06/27/24 16:22:44      Final result by  Evans Gaming MD (06/27/24 16:22:44)                   Impression:      1. No active cardiopulmonary disease identified      Electronically signed by: Evans Gaming  Date:    06/27/2024  Time:    16:22               Narrative:    EXAMINATION:  XR CHEST 1 VIEW    CLINICAL HISTORY:  , Palpitations.    COMPARISON:  None available    FINDINGS:  An AP view or more reveals the heart to be normal in size.  The trachea is midline.  No definite infiltrate or effusion is seen.  The lungs are well expanded.  Degenerative changes are noted to the thoracic spine.  Bony structures are osteopenic.                        Wet Read by Alexandro Amaral MD (06/27/24 15:50:37, [facility] - Emergency Dept, Emergency Medicine)    Lungs clear                                  EKG:  Normal sinus rhythm.    Telemetry:  Normal sinus rhythm.    Physical Exam  Alert and oriented x3  S1-S2 normal, regular  Lungs are clear to auscultation bilaterally   Home Medications:   No current facility-administered medications on file prior to encounter.     Current Outpatient Medications on File Prior to Encounter   Medication Sig Dispense Refill    entecavir (BARACLUDE) 1 MG Tab Take 1 tablet (1 mg total) by mouth Daily. (Patient not taking: Reported on 11/16/2023) 30 tablet 3    ENTRESTO 49-51 mg per tablet Take 1 tablet by mouth 2 (two) times a day.      metoprolol succinate (TOPROL-XL) 25 MG 24 hr tablet Take 1 tablet by mouth once daily.       Current Inpatient Medications:  No current facility-administered medications for this encounter.    Current Outpatient Medications:     entecavir (BARACLUDE) 1 MG Tab, Take 1 tablet (1 mg total) by mouth Daily. (Patient not taking: Reported on 11/16/2023), Disp: 30 tablet, Rfl: 3    ENTRESTO 49-51 mg per tablet, Take 1 tablet by mouth 2 (two) times a day., Disp: , Rfl:     metoprolol succinate (TOPROL-XL) 25 MG 24 hr tablet, Take 1 tablet by mouth once daily., Disp: , Rfl:   VTE Risk Mitigation  (From admission, onward)      None          Assessment:   Hypotension  Paroxysmal Atrial fibrillation with rapid ventricular response currently in sinus rhythm.  History of heart failure on Entresto at home  Non-Hodgkin's lymphoma      Plan:   -patient is currently in sinus rhythm.  Recommend to continue Amiodarone. patient low  blood pressure is mainly appears to be due to dehydration.  Recommend to continue fluid resuscitation.  - echocardiogram.  -rule out septic shock.      Patient meets ASPEN criteria for   malnutrition of   per RD assessment as evidenced by:                       A minimum of two characteristics is recommended for diagnosis of either severe or non-severe malnutrition.    Thank you for your consult.     Daniel Stroud MD  Cardiology  [facility] - Emergency Dept  06/28/2024

## 2024-06-29 LAB
OHS QRS DURATION: 80 MS
OHS QRS DURATION: 86 MS
OHS QRS DURATION: 88 MS
OHS QTC CALCULATION: 408 MS
OHS QTC CALCULATION: 483 MS
OHS QTC CALCULATION: 492 MS

## 2024-07-03 LAB
BACTERIA BLD CULT: NORMAL
BACTERIA BLD CULT: NORMAL

## 2025-02-03 DIAGNOSIS — I49.9 IRREGULAR HEART BEAT: Primary | ICD-10-CM

## 2025-02-03 DIAGNOSIS — C85.89 MARGINAL ZONE LYMPHOMA OF EXTRANODAL AND SOLID ORGAN SITES: ICD-10-CM
